# Patient Record
Sex: FEMALE | Race: WHITE | NOT HISPANIC OR LATINO | Employment: UNEMPLOYED | URBAN - METROPOLITAN AREA
[De-identification: names, ages, dates, MRNs, and addresses within clinical notes are randomized per-mention and may not be internally consistent; named-entity substitution may affect disease eponyms.]

---

## 2023-09-13 LAB
NIL(NEG) CONTROL SPOT COUNT: NORMAL
PANEL A SPOT COUNT: 1
PANEL B SPOT COUNT: 0
POS CONTROL SPOT COUNT: NORMAL
T-SPOT. TB INTERPRETATION: NEGATIVE

## 2023-12-12 ENCOUNTER — LAB (OUTPATIENT)
Dept: LAB | Facility: LAB | Age: 23
End: 2023-12-12
Payer: COMMERCIAL

## 2023-12-12 DIAGNOSIS — Z79.899 OTHER LONG TERM (CURRENT) DRUG THERAPY: ICD-10-CM

## 2023-12-12 DIAGNOSIS — L40.0 PSORIASIS VULGARIS: Primary | ICD-10-CM

## 2023-12-12 DIAGNOSIS — L40.59 OTHER PSORIATIC ARTHROPATHY (MULTI): ICD-10-CM

## 2023-12-12 DIAGNOSIS — L24.9 IRRITANT CONTACT DERMATITIS, UNSPECIFIED CAUSE: ICD-10-CM

## 2023-12-12 LAB
ALBUMIN SERPL BCP-MCNC: 4.1 G/DL (ref 3.4–5)
ALP SERPL-CCNC: 59 U/L (ref 33–110)
ALT SERPL W P-5'-P-CCNC: 8 U/L (ref 7–45)
ANION GAP SERPL CALC-SCNC: 11 MMOL/L (ref 10–20)
AST SERPL W P-5'-P-CCNC: 15 U/L (ref 9–39)
BASOPHILS # BLD AUTO: 0.08 X10*3/UL (ref 0–0.1)
BASOPHILS NFR BLD AUTO: 1.3 %
BILIRUB SERPL-MCNC: 0.5 MG/DL (ref 0–1.2)
BUN SERPL-MCNC: 9 MG/DL (ref 6–23)
CALCIUM SERPL-MCNC: 9 MG/DL (ref 8.6–10.3)
CHLORIDE SERPL-SCNC: 104 MMOL/L (ref 98–107)
CO2 SERPL-SCNC: 24 MMOL/L (ref 21–32)
CREAT SERPL-MCNC: 0.81 MG/DL (ref 0.5–1.05)
EOSINOPHIL # BLD AUTO: 0.35 X10*3/UL (ref 0–0.7)
EOSINOPHIL NFR BLD AUTO: 5.7 %
ERYTHROCYTE [DISTWIDTH] IN BLOOD BY AUTOMATED COUNT: 12 % (ref 11.5–14.5)
GFR SERPL CREATININE-BSD FRML MDRD: >90 ML/MIN/1.73M*2
GLUCOSE SERPL-MCNC: 72 MG/DL (ref 74–99)
HAV IGM SER QL: NONREACTIVE
HBV CORE IGM SER QL: NONREACTIVE
HBV SURFACE AG SERPL QL IA: NONREACTIVE
HCT VFR BLD AUTO: 42.8 % (ref 36–46)
HCV AB SER QL: NONREACTIVE
HGB BLD-MCNC: 14.2 G/DL (ref 12–16)
IMM GRANULOCYTES # BLD AUTO: 0.01 X10*3/UL (ref 0–0.7)
IMM GRANULOCYTES NFR BLD AUTO: 0.2 % (ref 0–0.9)
LYMPHOCYTES # BLD AUTO: 2.91 X10*3/UL (ref 1.2–4.8)
LYMPHOCYTES NFR BLD AUTO: 47.8 %
MCH RBC QN AUTO: 30.8 PG (ref 26–34)
MCHC RBC AUTO-ENTMCNC: 33.2 G/DL (ref 32–36)
MCV RBC AUTO: 93 FL (ref 80–100)
MONOCYTES # BLD AUTO: 0.44 X10*3/UL (ref 0.1–1)
MONOCYTES NFR BLD AUTO: 7.2 %
NEUTROPHILS # BLD AUTO: 2.3 X10*3/UL (ref 1.2–7.7)
NEUTROPHILS NFR BLD AUTO: 37.8 %
NRBC BLD-RTO: 0 /100 WBCS (ref 0–0)
PLATELET # BLD AUTO: 315 X10*3/UL (ref 150–450)
POTASSIUM SERPL-SCNC: 3.6 MMOL/L (ref 3.5–5.3)
PROT SERPL-MCNC: 6.3 G/DL (ref 6.4–8.2)
RBC # BLD AUTO: 4.61 X10*6/UL (ref 4–5.2)
SODIUM SERPL-SCNC: 135 MMOL/L (ref 136–145)
WBC # BLD AUTO: 6.1 X10*3/UL (ref 4.4–11.3)

## 2023-12-12 PROCEDURE — 36415 COLL VENOUS BLD VENIPUNCTURE: CPT

## 2023-12-12 PROCEDURE — 80074 ACUTE HEPATITIS PANEL: CPT

## 2023-12-12 PROCEDURE — 85025 COMPLETE CBC W/AUTO DIFF WBC: CPT

## 2023-12-12 PROCEDURE — 80053 COMPREHEN METABOLIC PANEL: CPT

## 2024-02-15 ENCOUNTER — HOSPITAL ENCOUNTER (OUTPATIENT)
Dept: RADIOLOGY | Facility: CLINIC | Age: 24
Discharge: HOME | End: 2024-02-15
Payer: COMMERCIAL

## 2024-02-15 ENCOUNTER — LAB (OUTPATIENT)
Dept: LAB | Facility: LAB | Age: 24
End: 2024-02-15
Payer: COMMERCIAL

## 2024-02-15 DIAGNOSIS — Z79.899 OTHER LONG TERM (CURRENT) DRUG THERAPY: ICD-10-CM

## 2024-02-15 DIAGNOSIS — L40.50 ARTHROPATHIC PSORIASIS, UNSPECIFIED (MULTI): Primary | ICD-10-CM

## 2024-02-15 DIAGNOSIS — L40.50 ARTHROPATHIC PSORIASIS, UNSPECIFIED (MULTI): ICD-10-CM

## 2024-02-15 LAB
ALBUMIN SERPL BCP-MCNC: 4.4 G/DL (ref 3.4–5)
ALP SERPL-CCNC: 58 U/L (ref 33–110)
ALT SERPL W P-5'-P-CCNC: 11 U/L (ref 7–45)
AST SERPL W P-5'-P-CCNC: 18 U/L (ref 9–39)
BASOPHILS # BLD AUTO: 0.08 X10*3/UL (ref 0–0.1)
BASOPHILS NFR BLD AUTO: 1.8 %
BILIRUB DIRECT SERPL-MCNC: 0.1 MG/DL (ref 0–0.3)
BILIRUB SERPL-MCNC: 0.8 MG/DL (ref 0–1.2)
CCP IGG SERPL-ACNC: <1 U/ML
CREAT SERPL-MCNC: 0.8 MG/DL (ref 0.5–1.05)
CRP SERPL-MCNC: 0.17 MG/DL
EGFRCR SERPLBLD CKD-EPI 2021: >90 ML/MIN/1.73M*2
EOSINOPHIL # BLD AUTO: 0.17 X10*3/UL (ref 0–0.7)
EOSINOPHIL NFR BLD AUTO: 3.9 %
ERYTHROCYTE [DISTWIDTH] IN BLOOD BY AUTOMATED COUNT: 11.6 % (ref 11.5–14.5)
ERYTHROCYTE [SEDIMENTATION RATE] IN BLOOD BY WESTERGREN METHOD: 1 MM/H (ref 0–20)
HCT VFR BLD AUTO: 43.8 % (ref 36–46)
HGB BLD-MCNC: 15 G/DL (ref 12–16)
IMM GRANULOCYTES # BLD AUTO: 0.01 X10*3/UL (ref 0–0.7)
IMM GRANULOCYTES NFR BLD AUTO: 0.2 % (ref 0–0.9)
LYMPHOCYTES # BLD AUTO: 2.2 X10*3/UL (ref 1.2–4.8)
LYMPHOCYTES NFR BLD AUTO: 50.1 %
MCH RBC QN AUTO: 31.3 PG (ref 26–34)
MCHC RBC AUTO-ENTMCNC: 34.2 G/DL (ref 32–36)
MCV RBC AUTO: 91 FL (ref 80–100)
MONOCYTES # BLD AUTO: 0.36 X10*3/UL (ref 0.1–1)
MONOCYTES NFR BLD AUTO: 8.2 %
NEUTROPHILS # BLD AUTO: 1.57 X10*3/UL (ref 1.2–7.7)
NEUTROPHILS NFR BLD AUTO: 35.8 %
NRBC BLD-RTO: 0 /100 WBCS (ref 0–0)
PLATELET # BLD AUTO: 267 X10*3/UL (ref 150–450)
PROT SERPL-MCNC: 7.2 G/DL (ref 6.4–8.2)
RBC # BLD AUTO: 4.8 X10*6/UL (ref 4–5.2)
RHEUMATOID FACT SER NEPH-ACNC: <10 IU/ML (ref 0–15)
WBC # BLD AUTO: 4.4 X10*3/UL (ref 4.4–11.3)

## 2024-02-15 PROCEDURE — 85025 COMPLETE CBC W/AUTO DIFF WBC: CPT

## 2024-02-15 PROCEDURE — 72170 X-RAY EXAM OF PELVIS: CPT

## 2024-02-15 PROCEDURE — 36415 COLL VENOUS BLD VENIPUNCTURE: CPT

## 2024-02-15 PROCEDURE — 72170 X-RAY EXAM OF PELVIS: CPT | Performed by: RADIOLOGY

## 2024-02-15 PROCEDURE — 72100 X-RAY EXAM L-S SPINE 2/3 VWS: CPT

## 2024-02-15 PROCEDURE — 72100 X-RAY EXAM L-S SPINE 2/3 VWS: CPT | Performed by: RADIOLOGY

## 2024-02-15 PROCEDURE — 86431 RHEUMATOID FACTOR QUANT: CPT

## 2024-02-15 PROCEDURE — 80076 HEPATIC FUNCTION PANEL: CPT

## 2024-02-15 PROCEDURE — 86140 C-REACTIVE PROTEIN: CPT

## 2024-02-15 PROCEDURE — 85652 RBC SED RATE AUTOMATED: CPT

## 2024-02-15 PROCEDURE — 86200 CCP ANTIBODY: CPT

## 2024-02-15 PROCEDURE — 82565 ASSAY OF CREATININE: CPT

## 2024-07-15 ENCOUNTER — LAB (OUTPATIENT)
Dept: LAB | Facility: LAB | Age: 24
End: 2024-07-15
Payer: COMMERCIAL

## 2024-07-15 DIAGNOSIS — L81.0 POSTINFLAMMATORY HYPERPIGMENTATION: ICD-10-CM

## 2024-07-15 DIAGNOSIS — Z79.899 OTHER LONG TERM (CURRENT) DRUG THERAPY: ICD-10-CM

## 2024-07-15 DIAGNOSIS — L40.0 PSORIASIS VULGARIS: Primary | ICD-10-CM

## 2024-07-15 PROCEDURE — 86481 TB AG RESPONSE T-CELL SUSP: CPT

## 2024-07-15 PROCEDURE — 36415 COLL VENOUS BLD VENIPUNCTURE: CPT

## 2024-07-17 LAB
NIL(NEG) CONTROL SPOT COUNT: NORMAL
PANEL A SPOT COUNT: 0
PANEL B SPOT COUNT: 0
POS CONTROL SPOT COUNT: NORMAL
T-SPOT. TB INTERPRETATION: NEGATIVE

## 2024-09-27 ENCOUNTER — OFFICE VISIT (OUTPATIENT)
Dept: URGENT CARE | Age: 24
End: 2024-09-27
Payer: COMMERCIAL

## 2024-09-27 VITALS
DIASTOLIC BLOOD PRESSURE: 76 MMHG | SYSTOLIC BLOOD PRESSURE: 116 MMHG | OXYGEN SATURATION: 99 % | TEMPERATURE: 98 F | HEART RATE: 102 BPM | RESPIRATION RATE: 20 BRPM

## 2024-09-27 DIAGNOSIS — N30.01 ACUTE CYSTITIS WITH HEMATURIA: Primary | ICD-10-CM

## 2024-09-27 DIAGNOSIS — R05.1 ACUTE COUGH: ICD-10-CM

## 2024-09-27 DIAGNOSIS — R30.0 DYSURIA: ICD-10-CM

## 2024-09-27 DIAGNOSIS — R09.81 NASAL CONGESTION: ICD-10-CM

## 2024-09-27 LAB
POC APPEARANCE, URINE: ABNORMAL
POC BILIRUBIN, URINE: NEGATIVE
POC BLOOD, URINE: ABNORMAL
POC COLOR, URINE: ABNORMAL
POC GLUCOSE, URINE: NEGATIVE MG/DL
POC KETONES, URINE: NEGATIVE MG/DL
POC LEUKOCYTES, URINE: ABNORMAL
POC NITRITE,URINE: NEGATIVE
POC PH, URINE: 6 PH
POC PROTEIN, URINE: ABNORMAL MG/DL
POC SPECIFIC GRAVITY, URINE: 1.02
POC UROBILINOGEN, URINE: 0.2 EU/DL
PREGNANCY TEST URINE, POC: NEGATIVE

## 2024-09-27 PROCEDURE — 87086 URINE CULTURE/COLONY COUNT: CPT

## 2024-09-27 RX ORDER — NITROFURANTOIN 25; 75 MG/1; MG/1
100 CAPSULE ORAL 2 TIMES DAILY
Qty: 14 CAPSULE | Refills: 0 | Status: SHIPPED | OUTPATIENT
Start: 2024-09-27 | End: 2024-10-04

## 2024-09-27 RX ORDER — IXEKIZUMAB 80 MG/ML
INJECTION, SOLUTION SUBCUTANEOUS
COMMUNITY

## 2024-09-27 ASSESSMENT — ENCOUNTER SYMPTOMS
SHORTNESS OF BREATH: 0
WHEEZING: 0
SINUS PAIN: 0
CHEST TIGHTNESS: 0
DYSURIA: 1
SINUS PRESSURE: 0
RHINORRHEA: 1
NAUSEA: 0
ABDOMINAL PAIN: 1
DIARRHEA: 0
FLANK PAIN: 0
HEMATURIA: 1
SORE THROAT: 0
FEVER: 0
FREQUENCY: 1
COUGH: 1
VOMITING: 0
CHILLS: 0

## 2024-09-27 NOTE — PROGRESS NOTES
Subjective   Patient ID: Heather Sutton is a 23 y.o. female. They present today with a chief complaint of Urinary Problem (2/3 days, blood, pain, increased frequency and cloudy urine.) and Other (Yesterday, pt states her ceiling fell in and she inhaled drywall dust and had a cough and black mucus.).    History of Present Illness  Patient presents for a few concerns:  1. Patient reports UTI symptoms x 3 days including urinary frequency, dysuria, cloudy urine, hematuria, suprapubic pressure. DENIES: flank pain, vaginal discharge, vaginal itching or burning, fevers, chills, nausea or vomiting.    2. Also reports that her ceiling fell in her bedroom yesterday morning. She states that the ceiling was plaster. When it fell it looked like black volcanic lito covering her and everything in her bedroom. She states she has had nasal congestion, runny nose and cough. DENIES: sore throat, ear pain, wheezing, SOB, chest tightness. She states she has called the health department and her landlord.           Past Medical History  Allergies as of 09/27/2024 - Reviewed 09/27/2024   Allergen Reaction Noted    Fluconazole Hives 09/27/2024       (Not in a hospital admission)       No past medical history on file.    No past surgical history on file.         Review of Systems  Review of Systems   Constitutional:  Negative for chills and fever.   HENT:  Positive for congestion and rhinorrhea. Negative for ear discharge, ear pain, sinus pressure, sinus pain, sneezing and sore throat.    Respiratory:  Positive for cough. Negative for chest tightness, shortness of breath and wheezing.    Gastrointestinal:  Positive for abdominal pain. Negative for diarrhea, nausea and vomiting.   Genitourinary:  Positive for dysuria, frequency and hematuria. Negative for decreased urine volume, flank pain, urgency, vaginal discharge and vaginal pain.                                  Objective    Vitals:    09/27/24 1006   BP: 116/76   BP Location: Left arm    Patient Position: Sitting   BP Cuff Size: Adult   Pulse: 102   Resp: 20   Temp: 36.7 °C (98 °F)   SpO2: 99%     No LMP recorded.    Physical Exam  Vitals and nursing note reviewed.   Constitutional:       General: She is not in acute distress.     Appearance: Normal appearance. She is not ill-appearing.   HENT:      Head: Normocephalic.      Right Ear: Tympanic membrane and ear canal normal.      Left Ear: Tympanic membrane and ear canal normal.      Nose: Congestion and rhinorrhea present.      Mouth/Throat:      Mouth: Mucous membranes are moist.      Pharynx: No oropharyngeal exudate or posterior oropharyngeal erythema.   Cardiovascular:      Rate and Rhythm: Normal rate and regular rhythm.      Heart sounds: Normal heart sounds.   Pulmonary:      Effort: Pulmonary effort is normal. No respiratory distress.      Breath sounds: Normal breath sounds. No wheezing, rhonchi or rales.   Abdominal:      General: Abdomen is flat. Bowel sounds are normal.      Palpations: Abdomen is soft.      Tenderness: There is no abdominal tenderness. There is no right CVA tenderness, left CVA tenderness, guarding or rebound.   Neurological:      Mental Status: She is alert.         Procedures    Point of Care Test & Imaging Results from this visit  Results for orders placed or performed in visit on 09/27/24   POCT UA Automated manually resulted   Result Value Ref Range    POC Color, Urine Straw Straw, Yellow, Light-Yellow    POC Appearance, Urine Cloudy (A) Clear    POC Glucose, Urine NEGATIVE NEGATIVE mg/dl    POC Bilirubin, Urine NEGATIVE NEGATIVE    POC Ketones, Urine NEGATIVE NEGATIVE mg/dl    POC Specific Gravity, Urine 1.020 1.005 - 1.035    POC Blood, Urine LARGE (3+) (A) NEGATIVE    POC PH, Urine 6.0 No Reference Range Established PH    POC Protein, Urine 100 (2+) (A) NEGATIVE, 30 (1+) mg/dl    POC Urobilinogen, Urine 0.2 0.2, 1.0 EU/DL    Poc Nitrite, Urine NEGATIVE NEGATIVE    POC Leukocytes, Urine SMALL (1+) (A) NEGATIVE    POCT pregnancy, urine manually resulted   Result Value Ref Range    Preg Test, Ur Negative Negative      No results found.    Diagnostic study results (if any) were reviewed by Caryn Bo PA-C.    Assessment/Plan   Allergies, medications, history, and pertinent labs/EKGs/Imaging reviewed by Caryn Bo PA-C.     Medical Decision Making  MDM- History, examination, and urine dipstick result are consistent with uncomplicated UTI without evidence of pyelonephritis or sepsis. Patient will be given antibiotic therapy and cultures pending. Supportive measures. Return to clinic or present to ED if symptoms change or worsen. Otherwise follow with PCP. Patient verbalized understanding and agrees with plan.      Orders and Diagnoses  Diagnoses and all orders for this visit:  Acute cystitis with hematuria  -     nitrofurantoin, macrocrystal-monohydrate, (Macrobid) 100 mg capsule; Take 1 capsule (100 mg) by mouth 2 times a day for 7 days.  -     Urine Culture  Dysuria  -     POCT UA Automated manually resulted  -     POCT pregnancy, urine manually resulted  Acute cough  Nasal congestion  --Recommend sinus rinses like Neti Pot. We discussed the need for proper home remediation and mold testing. Recommended air purifier use. No current signs of respiratory distress.  Discussed to follow up with PCP with any worsening symptoms.     Medical Admin Record      Patient disposition: Home    Electronically signed by Caryn Bo PA-C  10:33 AM

## 2024-09-27 NOTE — PATIENT INSTRUCTIONS
Your urine was sent to the lab for culture and sensitivity. You will be notified if your antibiotic needs to be changed based on sensitivity results. Take medications as directed. Take with food, yogurt, or a probiotic. I recommend taking a probiotic while taking this medication, and for 7 days after you finish it.     A probiotic is a supplement you can buy over-the-counter that helps support the good bacteria in your body while taking antibiotics. Probiotics help to avoid the side effects of antibiotics, such as diarrhea or yeast infections. It is best to take a probiotic about 2 hours after your dose of antibiotic. Drink plenty of fluids. Follow-up with primary care doctor in 3-5 days if symptoms persist. If for severe or worsening symptoms, please go to the ER

## 2024-09-29 LAB — BACTERIA UR CULT: ABNORMAL

## 2024-09-30 LAB — BACTERIA UR CULT: ABNORMAL

## 2024-10-08 ENCOUNTER — OFFICE VISIT (OUTPATIENT)
Dept: URGENT CARE | Age: 24
End: 2024-10-08
Payer: COMMERCIAL

## 2024-10-08 VITALS
TEMPERATURE: 98.1 F | OXYGEN SATURATION: 99 % | DIASTOLIC BLOOD PRESSURE: 84 MMHG | SYSTOLIC BLOOD PRESSURE: 128 MMHG | HEART RATE: 95 BPM | RESPIRATION RATE: 18 BRPM

## 2024-10-08 DIAGNOSIS — N30.00 ACUTE CYSTITIS WITHOUT HEMATURIA: Primary | ICD-10-CM

## 2024-10-08 DIAGNOSIS — R30.0 DYSURIA: ICD-10-CM

## 2024-10-08 LAB
POC APPEARANCE, URINE: ABNORMAL
POC BILIRUBIN, URINE: NEGATIVE
POC BLOOD, URINE: ABNORMAL
POC COLOR, URINE: ABNORMAL
POC GLUCOSE, URINE: NEGATIVE MG/DL
POC KETONES, URINE: NEGATIVE MG/DL
POC LEUKOCYTES, URINE: ABNORMAL
POC NITRITE,URINE: NEGATIVE
POC PH, URINE: 6 PH
POC PROTEIN, URINE: ABNORMAL MG/DL
POC SPECIFIC GRAVITY, URINE: 1.01
POC UROBILINOGEN, URINE: 0.2 EU/DL
PREGNANCY TEST URINE, POC: NEGATIVE

## 2024-10-08 PROCEDURE — 81025 URINE PREGNANCY TEST: CPT

## 2024-10-08 PROCEDURE — 87186 SC STD MICRODIL/AGAR DIL: CPT

## 2024-10-08 PROCEDURE — 81003 URINALYSIS AUTO W/O SCOPE: CPT

## 2024-10-08 PROCEDURE — 87086 URINE CULTURE/COLONY COUNT: CPT

## 2024-10-08 PROCEDURE — 99213 OFFICE O/P EST LOW 20 MIN: CPT

## 2024-10-08 RX ORDER — SULFAMETHOXAZOLE AND TRIMETHOPRIM 800; 160 MG/1; MG/1
1 TABLET ORAL 2 TIMES DAILY
Qty: 10 TABLET | Refills: 0 | Status: SHIPPED | OUTPATIENT
Start: 2024-10-08 | End: 2024-10-13

## 2024-10-08 ASSESSMENT — ENCOUNTER SYMPTOMS
BACK PAIN: 0
FLANK PAIN: 0
CHILLS: 0
FEVER: 0
DYSURIA: 1
HEMATURIA: 0
FREQUENCY: 1

## 2024-10-08 NOTE — PROGRESS NOTES
Subjective   Patient ID: Heather Sutton is a 23 y.o. female. They present today with a chief complaint of Difficulty Urinating (Pt is having UTI symptoms for the past few days. Pt advised that she just finished antibiotics for a recent UTI. ).    History of Present Illness  Patient presents with one day of urinary urgency, frequency, burning with urination. Denies hematuria. Denies back pain, flank pain. Claims sensation of incomplete emptying. Denies fever, chills, sweats. Denies n/v/d. Denies chest pain, sob.   Patient was seen 2 weeks ago for uti, had symptom resolution, now new symptoms returned.           Past Medical History  Allergies as of 10/08/2024 - Reviewed 09/27/2024   Allergen Reaction Noted    Fluconazole Hives 09/27/2024       (Not in a hospital admission)       No past medical history on file.    No past surgical history on file.         Review of Systems  Review of Systems   Constitutional:  Negative for chills and fever.   Genitourinary:  Positive for dysuria, frequency and urgency. Negative for flank pain, hematuria, vaginal bleeding, vaginal discharge and vaginal pain.   Musculoskeletal:  Negative for back pain.                                  Objective    Vitals:    10/08/24 0954   BP: 128/84   Pulse: 95   Resp: 18   Temp: 36.7 °C (98.1 °F)   SpO2: 99%     No LMP recorded.    Physical Exam  Constitutional:       General: She is not in acute distress.     Appearance: She is not toxic-appearing.   Abdominal:      General: Abdomen is flat. There is no distension.      Palpations: Abdomen is soft.      Tenderness: There is no abdominal tenderness. There is no right CVA tenderness, left CVA tenderness, guarding or rebound.   Neurological:      Mental Status: She is alert.         Procedures    Point of Care Test & Imaging Results from this visit  Results for orders placed or performed in visit on 10/08/24   POCT pregnancy, urine manually resulted   Result Value Ref Range    Preg Test, Ur Negative  Negative   POCT UA Automated manually resulted   Result Value Ref Range    POC Color, Urine Dark Pushpa (A) Straw, Yellow, Light-Yellow    POC Appearance, Urine Cloudy (A) Clear    POC Glucose, Urine NEGATIVE NEGATIVE mg/dl    POC Bilirubin, Urine NEGATIVE NEGATIVE    POC Ketones, Urine NEGATIVE NEGATIVE mg/dl    POC Specific Gravity, Urine 1.015 1.005 - 1.035    POC Blood, Urine LARGE (3+) (A) NEGATIVE    POC PH, Urine 6.0 No Reference Range Established PH    POC Protein, Urine 15 (1+) (A) NEGATIVE, 30 (1+) mg/dl    POC Urobilinogen, Urine 0.2 0.2, 1.0 EU/DL    Poc Nitrite, Urine NEGATIVE NEGATIVE    POC Leukocytes, Urine LARGE (3+) (A) NEGATIVE      No results found.    Diagnostic study results (if any) were reviewed by Hanna Lauren PA-C.    Assessment/Plan   Allergies, medications, history, and pertinent labs/EKGs/Imaging reviewed by Hanna Lauren PA-C.     Medical Decision Making  MDM- History, examination, and urine dipstick result are consistent with uncomplicated UTI without evidence of pyelonephritis or sepsis. Patient will be given antibiotic therapy and cultures pending. Supportive measures. Return to clinic or present to ED if symptoms change or worsen. Otherwise follow with PCP. Patient verbalized understanding and agrees with plan.      Orders and Diagnoses  Diagnoses and all orders for this visit:  Acute cystitis without hematuria  -     sulfamethoxazole-trimethoprim (Bactrim DS) 800-160 mg tablet; Take 1 tablet by mouth 2 times a day for 5 days.  Dysuria  -     POCT pregnancy, urine manually resulted  -     POCT UA Automated manually resulted  -     Urine Culture      Medical Admin Record      Patient disposition: Home    Electronically signed by Hanna Lauren PA-C  10:18 AM

## 2024-10-08 NOTE — LETTER
October 8, 2024     Patient: Heather Sutton   YOB: 2000   Date of Visit: 10/8/2024       To Whom It May Concern:    Heather Sutton was seen in my clinic on 10/8/2024. Please excuse Heather as she will be late for work.          Sincerely,         Hanna Lauren PA-C        CC: No Recipients

## 2024-10-11 LAB — BACTERIA UR CULT: ABNORMAL

## 2025-01-30 ENCOUNTER — APPOINTMENT (OUTPATIENT)
Dept: DERMATOLOGY | Facility: CLINIC | Age: 25
End: 2025-01-30
Payer: COMMERCIAL

## 2025-01-30 DIAGNOSIS — Z79.899 OTHER LONG TERM (CURRENT) DRUG THERAPY: ICD-10-CM

## 2025-01-30 DIAGNOSIS — L40.0 PSORIASIS VULGARIS: Primary | ICD-10-CM

## 2025-01-30 PROCEDURE — 99204 OFFICE O/P NEW MOD 45 MIN: CPT | Performed by: DERMATOLOGY

## 2025-01-30 RX ORDER — CLOBETASOL PROPIONATE 0.5 MG/ML
SOLUTION TOPICAL
Qty: 50 ML | Refills: 11 | Status: SHIPPED | OUTPATIENT
Start: 2025-01-30

## 2025-01-30 RX ORDER — KETOCONAZOLE 20 MG/ML
SHAMPOO, SUSPENSION TOPICAL
Qty: 120 ML | Refills: 11 | Status: SHIPPED | OUTPATIENT
Start: 2025-01-30

## 2025-01-30 RX ORDER — RISANKIZUMAB-RZAA 150 MG/ML
150 INJECTION SUBCUTANEOUS SEE ADMIN INSTRUCTIONS
Qty: 1 ML | Refills: 2 | Status: SHIPPED | OUTPATIENT
Start: 2025-01-30

## 2025-01-30 RX ORDER — RISANKIZUMAB-RZAA 150 MG/ML
150 INJECTION SUBCUTANEOUS SEE ADMIN INSTRUCTIONS
Qty: 2 ML | Refills: 0 | Status: SHIPPED | OUTPATIENT
Start: 2025-01-30

## 2025-01-30 RX ORDER — KETOCONAZOLE 20 MG/ML
SHAMPOO, SUSPENSION TOPICAL
COMMUNITY
Start: 2024-05-20

## 2025-01-30 ASSESSMENT — DERMATOLOGY QUALITY OF LIFE (QOL) ASSESSMENT
RATE HOW BOTHERED YOU ARE BY SYMPTOMS OF YOUR SKIN PROBLEM (EG, ITCHING, STINGING BURNING, HURTING OR SKIN IRRITATION): 3
ARE THERE EXCLUSIONS OR EXCEPTIONS FOR THE QUALITY OF LIFE ASSESSMENT: NO
WHAT SINGLE SKIN CONDITION LISTED BELOW IS THE PATIENT ANSWERING THE QUALITY-OF-LIFE ASSESSMENT QUESTIONS ABOUT: PSORIASIS
DATE THE QUALITY-OF-LIFE ASSESSMENT WAS COMPLETED: 67235
RATE HOW BOTHERED YOU ARE BY EFFECTS OF YOUR SKIN PROBLEMS ON YOUR ACTIVITIES (EG, GOING OUT, ACCOMPLISHING WHAT YOU WANT, WORK ACTIVITIES OR YOUR RELATIONSHIPS WITH OTHERS): 2
ARE THERE EXCLUSIONS OR EXCEPTIONS FOR THE QUALITY OF LIFE ASSESSMENT: NO
WHAT SINGLE SKIN CONDITION LISTED BELOW IS THE PATIENT ANSWERING THE QUALITY-OF-LIFE ASSESSMENT QUESTIONS ABOUT: PSORIASIS
RATE HOW EMOTIONALLY BOTHERED YOU ARE BY YOUR SKIN PROBLEM (FOR EXAMPLE, WORRY, EMBARRASSMENT, FRUSTRATION): 2
RATE HOW EMOTIONALLY BOTHERED YOU ARE BY YOUR SKIN PROBLEM (FOR EXAMPLE, WORRY, EMBARRASSMENT, FRUSTRATION): 2
DATE THE QUALITY-OF-LIFE ASSESSMENT WAS COMPLETED: 67235
RATE HOW BOTHERED YOU ARE BY SYMPTOMS OF YOUR SKIN PROBLEM (EG, ITCHING, STINGING BURNING, HURTING OR SKIN IRRITATION): 3
RATE HOW BOTHERED YOU ARE BY EFFECTS OF YOUR SKIN PROBLEMS ON YOUR ACTIVITIES (EG, GOING OUT, ACCOMPLISHING WHAT YOU WANT, WORK ACTIVITIES OR YOUR RELATIONSHIPS WITH OTHERS): 2

## 2025-01-30 ASSESSMENT — DERMATOLOGY PATIENT ASSESSMENT
HAVE YOU HAD OR DO YOU HAVE VASCULAR DISEASE: NO
DO YOU HAVE ANY NEW OR CHANGING LESIONS: NO
ARE YOU AN ORGAN TRANSPLANT RECIPIENT: NO
DO YOU HAVE IRREGULAR MENSTRUAL CYCLES: NO
WHAT TYPE OF BIRTH CONTROL: ORAL
DO YOU HAVE ANY NEW OR CHANGING LESIONS: NO
ARE YOU AN ORGAN TRANSPLANT RECIPIENT: NO
HAVE YOU HAD OR DO YOU HAVE A STAPH INFECTION: NO
DO YOU USE SUNSCREEN: DAILY
ARE YOU ON BIRTH CONTROL: YES
ARE YOU TRYING TO GET PREGNANT: NO
DO YOU USE A TANNING BED: YES, CURRENTLY

## 2025-01-30 ASSESSMENT — PATIENT GLOBAL ASSESSMENT (PGA)
PATIENT GLOBAL ASSESSMENT: PATIENT GLOBAL ASSESSMENT:  3 - MODERATE
PATIENT GLOBAL ASSESSMENT: PATIENT GLOBAL ASSESSMENT:  3 - MODERATE

## 2025-01-30 ASSESSMENT — ITCH NUMERIC RATING SCALE: HOW SEVERE IS YOUR ITCHING?: 6

## 2025-01-30 NOTE — PROGRESS NOTES
Subjective     Heather Sutton is a 24 y.o. female who presents for the following: Psoriasis (She reports h/o Psoriasis. She uses ketoconazole shampoo only at this time. She is currently flaring, and is itching. She reports flare of her face is new. H/o Taltz, Humira, and Otezla use.).     Psoriasis was initially managed by outsider dermatologist. She was started on Taltz and provided multiple samples for loading dose. Insurance was unwilling to cover maintenance dose and so she was tried on Humira and Otezla. There was no improvement and so she was restarted on Taltz on June 2024. There were no improvement on her 2nd round of Taltz. Additionally, when she restarted her Taltz, she noticed white flat, patches on the trunk/extremities and a dermatologist suspected this to be Tinea Versicolor. She was started on diflucan and she had a secondary reaction. She was started on prednisone for the reaction. Additionally, she also had a previous history of strep infections.     Previously, she had swelling of her ankles. Joint stiffness >30 min in the morning. She was evaluated by Dr. Flores, Rheumatology, for psoriatic arthritis. He did not think at the time of evaluation she has PsA but recommend to have her PsO treated which would also treat her PsA. If joints worsen, she can return to Rheumatology.     No current medical conditions.     Review of Systems:  No other skin or systemic complaints other than what is documented elsewhere in the note.    The following portions of the chart were reviewed this encounter and updated as appropriate:          Skin Cancer History  No skin cancer on file.      Specialty Problems    None       Objective   Well appearing patient in no apparent distress; mood and affect are within normal limits.    A focused skin examination was performed. All findings within normal limits unless otherwise noted below.    Thick erythematous plaques with white, adherent, micaceous scale.  Physician Global  Assessment: 4 - Marked.  Examinations of joints as well as fingers/toes reveals no signs or symptoms of psoriatic arthritis.  Patient had a negative Tb Test. Body Surface Area involved.  >10% Body Surface Area involved.               Assessment/Plan   Psoriasis vulgaris    Psoriasis vulgaris- severe, uncontrolled and flaring. BSA >10%   - Previous treatment: Humira, Otezla, Taltz (previously on loading dose and discontinue due to insurance and when restarted no improvement)- did not respond to treatment. Her last does of Taltz was in 9/2024  - Given high BSA involvement, severity of her PsO, and special site involvement (scalp and groin), we recommend systemic treatment with Skyrizi.   - START Skyrizi 150 mg at week 0 and week 4. Then 150 mg [id number].   - Recent labs work for HIV, Hep B surface antigen, Hepatitis C antibody were nonreactive. (1/2025). Recent CBC in the last 6 month was normal. Will need Hep B surface antibody, Hep B core antibody, Tspot. Pending orders.   - START clobetasol 0.05%solution once daily to the scalp as needed. Avoid face.   - Continue ketoconazole 2% shampoo. Use 2-3 times a week. Leave on for at least 5 minutes before washing off. Refills sent.   - START VTAMA 1% cream apply once daily to the affected area. Safe to use on eyelids and face. Rx sent to [facility].     Related Procedures  T-SPOT (Tb)  Hepatitis B Core Antibody, Total  Hepatitis B Surface Antibody  Comprehensive metabolic panel    Related Medications  tapinarof 1 % cream  Apply 1 Application topically once daily. Apply to the affected area.    clobetasol (Temovate) 0.05 % external solution  Use once a day as needed on the scalp. Avoid face.    ketoconazole (NIZOral) 2 % shampoo  Use 2-3x times a week. Leave on for 5 minutes before rinsing off.    Other long term (current) drug therapy    Refer to plan above    Related Procedures  T-SPOT (Tb)  Hepatitis B Core Antibody, Total  Hepatitis B Surface  Antibody  Comprehensive metabolic panel      RTC 6 months in person, Psoriasis     My Tanika, DO  Department of Dermatology    I saw and evaluated the patient. I personally obtained the key and critical portions of the history and physical exam or was physically present for key and critical portions performed by the resident/fellow. I reviewed the resident/fellow's documentation and discussed the patient with the resident/fellow. I agree with the resident/fellow's medical decision making as documented in the note.    Vaughn Cartagena MD PhD

## 2025-01-31 ENCOUNTER — SPECIALTY PHARMACY (OUTPATIENT)
Dept: PHARMACY | Facility: CLINIC | Age: 25
End: 2025-01-31

## 2025-02-02 LAB
ALBUMIN SERPL-MCNC: 4.4 G/DL (ref 3.6–5.1)
ALP SERPL-CCNC: 61 U/L (ref 31–125)
ALT SERPL-CCNC: 25 U/L (ref 6–29)
ANION GAP SERPL CALCULATED.4IONS-SCNC: 11 MMOL/L (CALC) (ref 7–17)
AST SERPL-CCNC: 20 U/L (ref 10–30)
BILIRUB SERPL-MCNC: 0.6 MG/DL (ref 0.2–1.2)
BUN SERPL-MCNC: 9 MG/DL (ref 7–25)
CALCIUM SERPL-MCNC: 9.6 MG/DL (ref 8.6–10.2)
CHLORIDE SERPL-SCNC: 103 MMOL/L (ref 98–110)
CO2 SERPL-SCNC: 24 MMOL/L (ref 20–32)
CREAT SERPL-MCNC: 0.75 MG/DL (ref 0.5–0.96)
EGFRCR SERPLBLD CKD-EPI 2021: 115 ML/MIN/1.73M2
GLUCOSE SERPL-MCNC: 80 MG/DL (ref 65–99)
HBV CORE AB SERPL QL IA: NORMAL
HBV SURFACE AB SERPL IA-ACNC: 6 MIU/ML
IGNF NEG CNTRL BLD: NORMAL
M TB IFN-G BLD-IMP: NEGATIVE
MITOGEN IGNF.SPOT COUNT BLD: NORMAL
POTASSIUM SERPL-SCNC: 4.4 MMOL/L (ref 3.5–5.3)
PROT SERPL-MCNC: 7.2 G/DL (ref 6.1–8.1)
QUEST PANEL A SPOT COUNT: 0
QUEST PANEL B SPOT COUNT: 0
SODIUM SERPL-SCNC: 138 MMOL/L (ref 135–146)

## 2025-02-09 ENCOUNTER — SPECIALTY PHARMACY (OUTPATIENT)
Dept: PHARMACY | Facility: CLINIC | Age: 25
End: 2025-02-09

## 2025-02-18 ENCOUNTER — SPECIALTY PHARMACY (OUTPATIENT)
Dept: PHARMACY | Facility: CLINIC | Age: 25
End: 2025-02-18

## 2025-03-11 PROCEDURE — RXMED WILLOW AMBULATORY MEDICATION CHARGE

## 2025-03-12 ENCOUNTER — PHARMACY VISIT (OUTPATIENT)
Dept: PHARMACY | Facility: CLINIC | Age: 25
End: 2025-03-12
Payer: COMMERCIAL

## 2025-03-13 ENCOUNTER — TELEMEDICINE CLINICAL SUPPORT (OUTPATIENT)
Dept: PHARMACY | Facility: HOSPITAL | Age: 25
End: 2025-03-13
Payer: COMMERCIAL

## 2025-03-13 DIAGNOSIS — Z79.899 OTHER LONG TERM (CURRENT) DRUG THERAPY: ICD-10-CM

## 2025-03-13 DIAGNOSIS — L40.0 PSORIASIS VULGARIS: ICD-10-CM

## 2025-03-13 RX ORDER — RISANKIZUMAB-RZAA 150 MG/ML
150 INJECTION SUBCUTANEOUS SEE ADMIN INSTRUCTIONS
Qty: 1 ML | Refills: 3 | Status: SHIPPED | OUTPATIENT
Start: 2025-03-13

## 2025-03-13 NOTE — PROGRESS NOTES
"[facility] Clinical Note  Initial Patient Education     Introduction  Heather Sutton is a 24 y.o. female who is on the specialty pharmacy service for management of: Dermatology Core.    Heather Sutton is initiating the following therapy: risankizumab-rzaa (Skyrizi) 150 mg/mL pen injector pen   Inject 1 pen (150 mg) under the skin once at week 0 and week 4 as directed     Medication receipt date: 03/13/25  Duration of therapy: Maintenance    The most recent encounter visit with the referring prescriber   Vaughn Cartagena MD PhD  on 01/30/25 was reviewed.  Pharmacy will continue to collaborate in the care of this patient with the referring prescriber.    Clinical Background  An initial assessment was conducted prior to first fill of the medication to determine the appropriateness of therapy given the patient's diagnosis, medication list, comorbidities, allergies, medical history, patient's ability to self administer medication, and therapeutic goals based on possible outcomes of therapy. Refer to initial assessment task completed on 02/03/25.    Labs/Procedures for clinical appropriateness that were reviewed include:   Dermatology- For Biologics- TB:   Lab Results   Component Value Date    TBSIN Negative 01/30/2025   , Hepatitis B panel:   Lab Results   Component Value Date    HEPBCAB NON-REACTIVE 01/30/2025    HEPBCIGM Nonreactive 12/12/2023    HEPBSAG Nonreactive 12/12/2023    HEPBSAB 6 (L) 01/30/2025   , Hepatitis C antibody:   Lab Results   Component Value Date    HEPCAB Nonreactive 12/12/2023   , HIV: No results found for: \"HIV1X2\", \"HIVCONF\", \"HIVITP\", and LFTs:   Lab Results   Component Value Date    ALT 25 01/30/2025    AST 20 01/30/2025       Education/Discussion  Heather was contacted on 3/13/2025 at 4:37 PM for a pharmacy visit with encounter number [phone] from:   [facility]  [address] [address]  Dept: " "[phone]  Dept Fax: [phone]  Loc: [phone]  Heather consented to a/an Telephone visit, which was performed.    Medication Start Date (planned or actual): 03/13/25 or 03/14/25  Education was conducted prior to start of therapy? Yes    Education discussed includes the following:  Patient Education  Counseled the Patient on the Following : Pharmacy contact information, Adherence and missed doses, Doses and administration, Possible side effects and management, Possible drug interactions, Lab monitoring and follow-up, Safe handling, storage, and disposal, Contraindications and precautions, Associated vaccinations  Learner: Patient  Education Method: Explanation  Education Response: Verbalizes understanding  Additional details of the medication specific counseling are found within the linked patient education flowsheet.     The follow up timeline was discussed. Every person responds to and reacts to therapy differently. Patient should be assessed for efficacy and tolerability in approximately: other - 4 months    Provided education on goals and possible outcomes of therapy:  Adherence with therapy  Timely completion of appropriate labs  Timely and appropriate follow up with provider  Identify and address medication interactions with presciption medications, OTC medications and supplements  Optimize or maintain quality of life  Dermatology: Prevent or reduce disease flares  Reduce use of topical agents (corticosteroids or other \"prn\" agents)  Reduction of plaque size, thickness and body surface area (PSO)    The importance of adherence was discussed and they were advised to take the medication as prescribed by their provider.     Impression/Plan  Review and Assessment   Reviewed During This Encounter: Medications  Medications Assessed for Appropriate Use, Dose, Route, Frequency, and Duration: Yes  Medication Reconciliation Completed: Yes  Drug Interactions Evaluated: Yes  Clinically Relevant Drug Interactions " "Identified: No    This patient has not been identified as high risk due to Lack of high risk qualifiers.  The following action was taken: N/A.    QOL/Patient Satisfaction  Rate your quality of life on scale of 1-10: 7  Rate your satisfaction with  Specialty Pharmacy on scale of 1-10: 3 (Patient reported \"3-4\")    The  Specialty Pharmacy Welcome packet may be viewed here:   Specialty Pharmacy Welcome Packet     Or by scanning QR code:      Provided contact information ([phone]) for [facility] and reviewed dispensing process, refill timeline and patient management follow up. Advised to contact the pharmacy if there are any adverse effects and/or changes to medication list, including prescriptions, OTC medications, herbal products, or supplements. Confirmed understanding of education conducted during assessment. All questions and concerns were addressed and patient was encouraged to reach out for additional questions or concerns.      Desiree Mcdonald, PharmD  "

## 2025-04-08 ENCOUNTER — SPECIALTY PHARMACY (OUTPATIENT)
Dept: PHARMACY | Facility: CLINIC | Age: 25
End: 2025-04-08

## 2025-04-08 PROCEDURE — RXMED WILLOW AMBULATORY MEDICATION CHARGE

## 2025-04-10 ENCOUNTER — SPECIALTY PHARMACY (OUTPATIENT)
Dept: PHARMACY | Facility: CLINIC | Age: 25
End: 2025-04-10

## 2025-04-15 ENCOUNTER — PHARMACY VISIT (OUTPATIENT)
Dept: PHARMACY | Facility: CLINIC | Age: 25
End: 2025-04-15
Payer: COMMERCIAL

## 2025-07-09 ENCOUNTER — SPECIALTY PHARMACY (OUTPATIENT)
Dept: PHARMACY | Facility: CLINIC | Age: 25
End: 2025-07-09

## 2025-07-09 NOTE — PROGRESS NOTES
"[facility] Clinical Note  Patient Reassessment     Introduction  Heather Sutton is a 24 y.o. female who is on the specialty pharmacy service for management of: Dermatology Core.      [facility] supplied medication: risankizumab-rzaa (Skyrizi) 150 mg/mL pen injector pen  Inject 150 mg (1 pen) on week 4, then every 12 weeks thereafter        Duration of therapy: Maintenance    The most recent encounter visit with the referring prescriber Vaughn Cartagena MD PhD  on 01/30/25 was reviewed.  Pharmacy will continue to collaborate in the care of this patient with the referring prescriber.    Discussion  Heather was contacted on 7/9/2025 at 2:11 PM for a pharmacy visit with encounter number [id number] from:   [facility]  [address] [place] [id number]  Dept: [phone]  Dept Fax: [phone]  Heather consented to a/an Telephone visit, which was performed.    Efficacy  Patient has developed new symptoms of condition: No  Patient/caregiver feels medication is affecting the disease state: Patient reported great improvement to skin with Skyrizi. She states her skin is almost completely clear where just in the last week her scalp started getting a bit flaky. She denies any recent flares and does not use any topical agents besides topical shampoo as needed.     Goals  Provided education on goals and possible outcomes of therapy:  Adherence with therapy  Timely completion of appropriate labs  Timely and appropriate follow up with provider  Identify and address medication interactions with presciption medications, OTC medications and supplements  Optimize or maintain quality of life  Dermatology: Prevent or reduce disease flares  Reduce use of topical agents (corticosteroids or other \"prn\" agents)  Reduction of plaque size, thickness and body surface area (PSO)  Patient has documented target(s) for goals of therapy: Yes    Targets       Target Due Progress Assessment Last " "Assessed Completed Completed By Outcome Source     Goal: Prevent and reduce disease flares 5/31/2025 On Track (Improving) 7/9/2025 -- -- -- Clinical Management - 1/31/2025         Goal: Reduce use of ancillary or \"prn\" medications 5/31/2025 On Track (Improving) 7/9/2025 -- -- -- Clinical Management - 1/31/2025                Tolerance  Patient has experienced side effects from this medication: Yes - injection site irritation that resolves within a few hours  Changes to current therapy regimen: No    The follow-up timeline was discussed. Every person responds to and reacts to therapy differently. Patient should be assessed for efficacy and tolerability in approximately: other - 4months            Adherence  Patient Information  Informant: Self (Patient)  Demonstrates Understanding of Importance of Adherence: Yes  Does the patient have any barriers to self-administration (including physical and mental?): No  Medication Information  Medication: risankizumab-rzaa (Skyrizi)  Patient Reported Missed Doses in the Last 4 Weeks: 0  Estimated Medication Adherence Level: Good  Adherence Estimation Source: Claims history  Barriers to Adherence: No Problems identified   The importance of adherence was discussed and patient/caregiver was advised to take the medication as prescribed by their provider. Encouraged patient/caregiver to call physician's office or specialty pharmacy if they have a question regarding a missed dose.    General Assessment  Changes to home medications, OTCs or supplements: No  Current Medications[1]  Reported new allergies: No  Reported new medical conditions: No  Additional monitoring reviewed: Dermatology- For Biologics- TB:   Lab Results   Component Value Date    TBSIN Negative 01/30/2025     Is laboratory follow up needed? No    Advised to contact the pharmacy if there are any changes to the patient's medication list, including prescriptions, OTC medications, herbal products, or " supplements.    Impression/Plan  This patient has not been identified as high risk due to Lack of high risk qualifiers.  The following action was taken:N/A          QOL/Patient Satisfaction  Rate your quality of life on scale of 1-10: 8  Rate your satisfaction with  Specialty Pharmacy on scale of 1-10: 9    Provided contact information ([phone]) for [facility] and reviewed dispensing process, refill timeline and patient management follow up. Confirmed understanding of education conducted during assessment. All questions and concerns were addressed and patient/caregiver was encouraged to reach out for additional questions or concerns.    Based on the patient's diagnosis, medication list, progress towards goals, adherence, tolerance, and medication list, medication remains appropriate: Therapy remains appropriate (I attest)    Desiree Mcdonald, PharmD       [1]   Current Outpatient Medications   Medication Sig Dispense Refill    clobetasol (Temovate) 0.05 % external solution Use once a day as needed on the scalp. Avoid face. 50 mL 11    ketoconazole (NIZOral) 2 % shampoo WASH THE SCALP EVERY OTHER WASH. LATHER AND LET SIT FOR 3-5 MINUTES BEFORE RINSING      ketoconazole (NIZOral) 2 % shampoo Use 2-3x times a week. Leave on for 5 minutes before rinsing off. 120 mL 11    risankizumab-rzaa (Skyrizi) 150 mg/mL pen injector pen Inject 150 mg (1 pen) on week 4, then every 12 weeks thereafter 1 mL 3    tapinarof 1 % cream Apply 1 Application topically once daily. Apply to the affected area. 60 g 11     No current facility-administered medications for this visit.

## 2025-07-10 ENCOUNTER — PHARMACY VISIT (OUTPATIENT)
Dept: PHARMACY | Facility: CLINIC | Age: 25
End: 2025-07-10
Payer: COMMERCIAL

## 2025-07-10 PROCEDURE — RXMED WILLOW AMBULATORY MEDICATION CHARGE

## 2025-07-17 ENCOUNTER — APPOINTMENT (OUTPATIENT)
Dept: DERMATOLOGY | Facility: CLINIC | Age: 25
End: 2025-07-17
Payer: COMMERCIAL

## 2025-07-17 DIAGNOSIS — L21.9 SEBORRHEIC DERMATITIS: ICD-10-CM

## 2025-07-17 DIAGNOSIS — L40.0 PSORIASIS VULGARIS: Primary | ICD-10-CM

## 2025-07-17 DIAGNOSIS — Z79.899 OTHER LONG TERM (CURRENT) DRUG THERAPY: ICD-10-CM

## 2025-07-17 PROCEDURE — 99213 OFFICE O/P EST LOW 20 MIN: CPT | Performed by: DERMATOLOGY

## 2025-07-17 NOTE — Clinical Note
Mildly erythematous plaque with overlying greasy scale on left parietal scalp. Prior sites of involvement on trunk now with normal appearing skin.

## 2025-07-17 NOTE — Clinical Note
Psoriasis vulgaris- severe, well controlled on Skyrizi, BSA <3%  - Previous treatment: Humira, Otezla, Taltz (previously on loading dose and discontinue due to insurance and when restarted no improvement)- did not respond to treatment. Her last does of Taltz was in 9/2024.  - Given high BSA involvement, severity of her PsO, and special site involvement (scalp and groin), Skyrizi initiated January 2025. Last Skyrizi injection 7/15.  - Continue Skyrizi 150 mg [id number].   - Reordered T-spot for December, prior to refill of Skyrizi.  - Continue clobetasol 0.05% solution once daily to the scalp as needed. Avoid face.   - Continue ketoconazole 2% shampoo. Use 2-3 times a week. Leave on for at least 5-7 minutes before washing off. Refills sent.   - Continue  VTAMA 1% cream apply once daily PRN to the affected area. Safe to use on eyelids and face.

## 2025-07-17 NOTE — Clinical Note
Ddx: psoriasis partially treated vs seborrheic dermatitis. Continue ketoconazole 2% shampoo. Leave on for 5-10 minutes per application, 2-3 times per week.   Continue clobetasol 0.05% solution to scalp as needed for itch.

## 2025-07-17 NOTE — PROGRESS NOTES
Subjective     Heather Sutton is a 24 y.o. female who presents for the following: Psoriasis (Skyrizi ( Specialty) - working well. Most recent injection was on 7/15 just started maintenance dosing ).     She feels very well controlled on her current dose of Skyrizi. She feels like her skin is significantly better and she has noticed less joint stiffness in the morning.  She states that she has noticed some greasy scale on her left scalp, however has not been using ketoconazole shampoo or clobetasol to the scalp yet.    No current medical conditions.     Review of Systems:  No other skin or systemic complaints other than what is documented elsewhere in the note.    The following portions of the chart were reviewed this encounter and updated as appropriate:          Skin Cancer History  No skin cancer on file.      Specialty Problems    None       Objective   Well appearing patient in no apparent distress; mood and affect are within normal limits.    A focused skin examination was performed. All findings within normal limits unless otherwise noted below.    Generalized  Mildly erythematous plaque with overlying greasy scale on left parietal scalp. Prior sites of involvement on trunk now with normal appearing skin.      Left Parietal Scalp  Erythema with overlying greasy scale.       Assessment/Plan   PSORIASIS VULGARIS  Generalized  Psoriasis vulgaris- severe, well controlled on Skyrizi, BSA <3%  - Previous treatment: Humira, Otezla, Taltz (previously on loading dose and discontinue due to insurance and when restarted no improvement)- did not respond to treatment. Her last does of Taltz was in 9/2024.  - Given high BSA involvement, severity of her PsO, and special site involvement (scalp and groin), Skyrizi initiated January 2025. Last Skyrizi injection 7/15.  - Continue Skyrizi 150 mg [id number].   - Reordered T-spot for December, prior to refill of Skyrizi.  - Continue clobetasol 0.05% solution once daily to the scalp  as needed. Avoid face.   - Continue ketoconazole 2% shampoo. Use 2-3 times a week. Leave on for at least 5-7 minutes before washing off. Refills sent.   - Continue  VTAMA 1% cream apply once daily PRN to the affected area. Safe to use on eyelids and face.   - T-SPOT (Tb)  This Visit  - Follow Up In Dermatology - Established Patient  Existing Treatments  - tapinarof 1 % cream - Apply 1 Application topically once daily. Apply to the affected area.  - clobetasol (Temovate) 0.05 % external solution - Use once a day as needed on the scalp. Avoid face.  - ketoconazole (NIZOral) 2 % shampoo - Use 2-3x times a week. Leave on for 5 minutes before rinsing off.  - risankizumab-rzaa (Skyrizi) 150 mg/mL pen injector pen - Inject 150 mg (1 pen) on week 4, then every 12 weeks thereafter  OTHER LONG TERM (CURRENT) DRUG THERAPY  Generalized  Refer to plan above  This Visit  - Follow Up In Dermatology - Established Patient  Existing Treatments  - risankizumab-rzaa (Skyrizi) 150 mg/mL pen injector pen - Inject 150 mg (1 pen) on week 4, then every 12 weeks thereafter  SEBORRHEIC DERMATITIS  Left Parietal Scalp  Ddx: psoriasis partially treated vs seborrheic dermatitis. Continue ketoconazole 2% shampoo. Leave on for 5-10 minutes per application, 2-3 times per week.   Continue clobetasol 0.05% solution to scalp as needed for itch.       RTC 10 months for PsO.  Patient seen and discussed with Dr. Mitzi Ge MD   PGY2 Dermatology Resident  7/17/2025     I saw and evaluated the patient. I personally obtained the key and critical portions of the history and physical exam or was physically present for key and critical portions performed by the resident/fellow. I reviewed the resident/fellow's documentation and discussed the patient with the resident/fellow. I agree with the resident/fellow's medical decision making as documented in the note.    Vaughn Cartagena MD PhD

## 2026-05-07 ENCOUNTER — APPOINTMENT (OUTPATIENT)
Dept: DERMATOLOGY | Facility: CLINIC | Age: 26
End: 2026-05-07
Payer: COMMERCIAL